# Patient Record
Sex: MALE
[De-identification: names, ages, dates, MRNs, and addresses within clinical notes are randomized per-mention and may not be internally consistent; named-entity substitution may affect disease eponyms.]

---

## 2022-07-12 ENCOUNTER — HOSPITAL ENCOUNTER (INPATIENT)
Dept: HOSPITAL 5 - ED | Age: 47
LOS: 6 days | Discharge: HOME | DRG: 641 | End: 2022-07-18
Attending: INTERNAL MEDICINE | Admitting: INTERNAL MEDICINE
Payer: SELF-PAY

## 2022-07-12 DIAGNOSIS — E87.6: Primary | ICD-10-CM
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
## 2022-07-12 DIAGNOSIS — R74.01: ICD-10-CM
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
## 2022-07-12 DIAGNOSIS — K70.10: ICD-10-CM

## 2022-07-12 DIAGNOSIS — F10.20: ICD-10-CM

## 2022-07-12 DIAGNOSIS — F10.231: ICD-10-CM

## 2022-07-12 DIAGNOSIS — S09.90XA: ICD-10-CM

## 2022-07-12 LAB
ALBUMIN SERPL-MCNC: 3.9 G/DL (ref 3.9–5)
ALT SERPL-CCNC: 171 UNITS/L (ref 7–56)
BASOPHILS # (AUTO): 0 K/MM3 (ref 0–0.1)
BASOPHILS NFR BLD AUTO: 0.3 % (ref 0–1.8)
BILIRUB UR QL STRIP: (no result)
BLOOD UR QL VISUAL: (no result)
BUN SERPL-MCNC: 6 MG/DL (ref 9–20)
BUN/CREAT SERPL: 15 %
CALCIUM SERPL-MCNC: 8.3 MG/DL (ref 8.4–10.2)
EOSINOPHIL # BLD AUTO: 0 K/MM3 (ref 0–0.4)
EOSINOPHIL NFR BLD AUTO: 0.3 % (ref 0–4.3)
HCT VFR BLD CALC: 36.5 % (ref 35.5–45.6)
HEMOLYSIS INDEX: 10
HGB BLD-MCNC: 12.9 GM/DL (ref 11.8–15.2)
HYALINE CASTS #/AREA URNS LPF: 21 /LPF
LYMPHOCYTES # BLD AUTO: 0.3 K/MM3 (ref 1.2–5.4)
LYMPHOCYTES NFR BLD AUTO: 7.1 % (ref 13.4–35)
MCHC RBC AUTO-ENTMCNC: 35 % (ref 32–34)
MCV RBC AUTO: 101 FL (ref 84–94)
MONOCYTES # (AUTO): 0.4 K/MM3 (ref 0–0.8)
MONOCYTES % (AUTO): 9 % (ref 0–7.3)
MUCOUS THREADS #/AREA URNS HPF: (no result) /HPF
PH UR STRIP: 6 [PH] (ref 5–7)
PLATELET # BLD: 38 K/MM3 (ref 140–440)
RBC # BLD AUTO: 3.6 M/MM3 (ref 3.65–5.03)
RBC #/AREA URNS HPF: 19 /HPF (ref 0–6)
UROBILINOGEN UR-MCNC: 2 MG/DL (ref ?–2)
WBC #/AREA URNS HPF: 1 /HPF (ref 0–6)

## 2022-07-12 PROCEDURE — 93005 ELECTROCARDIOGRAM TRACING: CPT

## 2022-07-12 PROCEDURE — 80053 COMPREHEN METABOLIC PANEL: CPT

## 2022-07-12 PROCEDURE — 81001 URINALYSIS AUTO W/SCOPE: CPT

## 2022-07-12 PROCEDURE — 36415 COLL VENOUS BLD VENIPUNCTURE: CPT

## 2022-07-12 PROCEDURE — 80074 ACUTE HEPATITIS PANEL: CPT

## 2022-07-12 PROCEDURE — 85025 COMPLETE CBC W/AUTO DIFF WBC: CPT

## 2022-07-12 PROCEDURE — 70450 CT HEAD/BRAIN W/O DYE: CPT

## 2022-07-12 PROCEDURE — 80048 BASIC METABOLIC PNL TOTAL CA: CPT

## 2022-07-12 PROCEDURE — 80320 DRUG SCREEN QUANTALCOHOLS: CPT

## 2022-07-12 PROCEDURE — G0480 DRUG TEST DEF 1-7 CLASSES: HCPCS

## 2022-07-12 PROCEDURE — 83690 ASSAY OF LIPASE: CPT

## 2022-07-12 PROCEDURE — 82150 ASSAY OF AMYLASE: CPT

## 2022-07-12 RX ADMIN — DEXTROSE AND SODIUM CHLORIDE SCH MLS/HR: 5; .9 INJECTION, SOLUTION INTRAVENOUS at 23:07

## 2022-07-12 RX ADMIN — Medication SCH ML: at 23:12

## 2022-07-12 RX ADMIN — LEVETIRACETAM SCH MLS/HR: 100 INJECTION, SOLUTION INTRAVENOUS at 19:28

## 2022-07-12 RX ADMIN — HEPARIN SODIUM SCH UNIT: 5000 INJECTION, SOLUTION INTRAVENOUS; SUBCUTANEOUS at 23:09

## 2022-07-12 NOTE — EVENT NOTE
Date: 07/12/22





Prior to discharge patient had a witnessed generalized tonic-clonic seizure.  

Presumably alcohol withdrawal.  Patient given Ativan and started on CIWA 

protocol.  Patient will be admitted to the hospital for further observation





Care transferred to hospitalist (Dr. Jacobo)

## 2022-07-12 NOTE — CAT SCAN REPORT
CT head/brain wo con



INDICATION:

fall.



TECHNIQUE: Routine CT head. All CT scans at this location are performed using CT dose reduction for A
DAV by means of automated exposure control.



COMPARISON: 

None.



FINDINGS:



Intracranial: Gray-white matter differentiation is maintained. No intracranial hemorrhage. No extra a
xial collection. No hydrocephalus. No herniation.

Sinuses: Moderate mucosal thickening in the paranasal sinuses. Mastoid air cells are clear..

Orbits: Globes are intact. 

Calvarium: No acute fracture.





IMPRESSION:

1.  No acute intracranial abnormality.



Signer Name: Kamar Joyner MD 

Signed: 7/12/2022 1:48 PM

Workstation Name: [id number]

## 2022-07-12 NOTE — HISTORY AND PHYSICAL REPORT
History of Present Illness


Date of examination: 07/12/22


Date of admission: 


7/12/2022


Chief complaint: 





Fall and hitting his head


Altered sensorium


History of present illness: 





The patient is a 46-year-old male present with a chief complaint of head injury 

after fall.  Patient states he recalls drinking alcohol yesterday and then 

falling injuring his head.  Patient states he does not really remember the event

 well but now has a headache in the occipital region.  Patient gives his 

headache a score of 10/10.  Patient denies neck pain.  Patient denies nausea or 

vomiting.  Patient states he drank heavily yesterday nearly half a bottle of 

tequila.  Binge drinking present.











- Past Medical History


Previous Medical History?: No





- Surgical History


Past Surgical History?: No





- Family History


Family history: no significant





- Social History


Smoking Status: Never Smoker


Substance Use Type: None, Alcohol (Drinks daily for the last month)





- Medications


Home Medications: 


                                Home Medications











 Medication  Instructions  Recorded  Confirmed  Last Taken  Type


 


traMADoL [Ultram] 50 mg PO Q6HR PRN #10 tablet 07/12/22  Unknown Rx














Review of Systems


ROS: 


Stated complaint: GROUND LEVEL FALL


Other details as noted in HPI





Constitutional: no symptoms reported


Eyes: denies: eye pain


ENT: denies: throat pain


Respiratory: no symptoms reported


Cardiovascular: denies: chest pain


Endocrine: no symptoms reported


Gastrointestinal: denies: abdominal pain, nausea, vomiting


Genitourinary: denies: dysuria


Musculoskeletal: denies: arthralgia


Neurological: headache








Medications and Allergies


                                    Allergies











Allergy/AdvReac Type Severity Reaction Status Date / Time


 


No Known Allergies Allergy   Unverified 07/12/22 11:53











                                Home Medications











 Medication  Instructions  Recorded  Confirmed  Last Taken  Type


 


traMADoL [Ultram] 50 mg PO Q6HR PRN #10 tablet 07/12/22  Unknown Rx











Active Meds: 


Active Medications





Lorazepam (Lorazepam 2 Mg/Ml Vial)  2 mg IV Q1HR PRN


   PRN Reason: CIWA-Ar 8-15


Lorazepam (Lorazepam 2 Mg/Ml Vial)  4 mg IV Q1HR PRN


   PRN Reason: CIWA-Ar 16-25


Lorazepam (Lorazepam 2 Mg/Ml Vial)  4 mg IV Q15MIN PRN


   PRN Reason: CIWA-Ar >25











Exam





- Constitutional


Vitals: 


                                        











Temp Pulse Resp BP Pulse Ox


 


 98.0 F   107 H  18   128/84   99 


 


 07/12/22 11:53  07/12/22 17:49  07/12/22 17:49  07/12/22 17:49  07/12/22 17:49











General appearance: Present: no acute distress, well-nourished





- EENT


Eyes: Present: PERRL


ENT: hearing intact, clear oral mucosa





- Neck


Neck: Present: supple, normal ROM





- Respiratory


Respiratory effort: normal


Respiratory: bilateral: CTA





- Cardiovascular


Heart rate: 78


Rhythm: regular


Heart Sounds: Present: S1 & S2.  Absent: rub, click





- Extremities


Extremities: no ischemia, pulses intact, pulses symmetrical, No edema


Peripheral Pulses: within normal limits





- Abdominal


General gastrointestinal: Present: soft, non-tender, non-distended, normal bowel

 sounds


Male genitourinary: Present: normal





- Integumentary


Integumentary: Present: clear, warm, dry





- Musculoskeletal


Musculoskeletal: gait normal, strength equal bilaterally





- Psychiatric


Psychiatric: appropriate mood/affect, intact judgment & insight





- Neurologic


Neurologic: CNII-XII intact, moves all extremities





Results





- Labs


CBC & Chem 7: 


                                 07/13/22 05:36





                                 07/13/22 05:36


Labs: 


                             Laboratory Last Values











WBC  4.4 K/mm3 (4.5-11.0)  L  07/12/22  14:08    


 


RBC  3.60 M/mm3 (3.65-5.03)  L  07/12/22  14:08    


 


Hgb  12.9 gm/dl (11.8-15.2)   07/12/22  14:08    


 


Hct  36.5 % (35.5-45.6)   07/12/22  14:08    


 


MCV  101 fl (84-94)  H  07/12/22  14:08    


 


MCH  36 pg (28-32)  H  07/12/22  14:08    


 


MCHC  35 % (32-34)  H  07/12/22  14:08    


 


RDW  15.2 % (13.2-15.2)   07/12/22  14:08    


 


Plt Count  38 K/mm3 (140-440)  L  07/12/22  14:08    


 


Lymph % (Auto)  7.1 % (13.4-35.0)  L  07/12/22  14:08    


 


Mono % (Auto)  9.0 % (0.0-7.3)  H  07/12/22  14:08    


 


Eos % (Auto)  0.3 % (0.0-4.3)   07/12/22  14:08    


 


Baso % (Auto)  0.3 % (0.0-1.8)   07/12/22  14:08    


 


Lymph # (Auto)  0.3 K/mm3 (1.2-5.4)  L  07/12/22  14:08    


 


Mono # (Auto)  0.4 K/mm3 (0.0-0.8)   07/12/22  14:08    


 


Eos # (Auto)  0.0 K/mm3 (0.0-0.4)   07/12/22  14:08    


 


Baso # (Auto)  0.0 K/mm3 (0.0-0.1)   07/12/22  14:08    


 


Seg Neutrophils %  83.3 % (40.0-70.0)  H  07/12/22  14:08    


 


Seg Neutrophils #  3.7 K/mm3 (1.8-7.7)   07/12/22  14:08    


 


Sodium  140 mmol/L (137-145)   07/12/22  14:08    


 


Potassium  3.6 mmol/L (3.6-5.0)   07/12/22  14:08    


 


Chloride  102.7 mmol/L ()   07/12/22  14:08    


 


Carbon Dioxide  22 mmol/L (22-30)   07/12/22  14:08    


 


Anion Gap  19 mmol/L  07/12/22  14:08    


 


BUN  6 mg/dL (9-20)  L  07/12/22  14:08    


 


Creatinine  0.4 mg/dL (0.8-1.3)  L  07/12/22  14:08    


 


Estimated GFR  > 60 ml/min  07/12/22  14:08    


 


BUN/Creatinine Ratio  15 %  07/12/22  14:08    


 


Glucose  100 mg/dL ()   07/12/22  14:08    


 


Calcium  8.3 mg/dL (8.4-10.2)  L  07/12/22  14:08    


 


Total Bilirubin  2.00 mg/dL (0.1-1.2)  H  07/12/22  14:08    


 


AST  379 units/L (5-40)  H  07/12/22  14:08    


 


ALT  171 units/L (7-56)  H  07/12/22  14:08    


 


Alkaline Phosphatase  125 units/L ()   07/12/22  14:08    


 


Total Protein  6.8 g/dL (6.3-8.2)   07/12/22  14:08    


 


Albumin  3.9 g/dL (3.9-5)   07/12/22  14:08    


 


Albumin/Globulin Ratio  1.3 %  07/12/22  14:08    


 


Urine Color  Marily  (Yellow)   07/12/22  Unknown


 


Urine Turbidity  Slightly-cloudy  (Clear)   07/12/22  Unknown


 


Urine pH  6.0  (5.0-7.0)   07/12/22  Unknown


 


Ur Specific Gravity  1.012  (1.003-1.030)   07/12/22  Unknown


 


Urine Protein  100 mg/dl mg/dL (Negative)   07/12/22  Unknown


 


Urine Glucose (UA)  Neg mg/dL (Negative)   07/12/22  Unknown


 


Urine Ketones  Neg mg/dL (Negative)   07/12/22  Unknown


 


Urine Blood  Mod  (Negative)   07/12/22  Unknown


 


Urine Nitrite  Neg  (Negative)   07/12/22  Unknown


 


Urine Bilirubin  Neg  (Negative)   07/12/22  Unknown


 


Urine Urobilinogen  2.0 mg/dL (<2.0)   07/12/22  Unknown


 


Ur Leukocyte Esterase  Neg  (Negative)   07/12/22  Unknown


 


Urine WBC (Auto)  1.0 /HPF (0.0-6.0)   07/12/22  Unknown


 


Urine RBC (Auto)  19.0 /HPF (0.0-6.0)   07/12/22  Unknown


 


U Epithel Cells (Auto)  1.0 /HPF (0-13.0)   07/12/22  Unknown


 


Hyaline Casts  21 /LPF  07/12/22  Unknown


 


Urine Mucus  Few /HPF  07/12/22  Unknown


 


Plasma/Serum Alcohol  0.06 % (0-0.07)   07/12/22  14:08    








                                    Short CBC











  07/12/22 07/13/22 Range/Units





  14:08 05:36 


 


WBC  4.4 L  3.2 L  (4.5-11.0)  K/mm3


 


Hgb  12.9  12.8  (11.8-15.2)  gm/dl


 


Hct  36.5  37.1  (35.5-45.6)  %


 


Plt Count  38 L  35 L  (140-440)  K/mm3








                                       BMP











  07/12/22 07/13/22





  14:08 05:36


 


Sodium  140  138


 


Potassium  3.6  3.2 L


 


Chloride  102.7  103.9


 


Carbon Dioxide  22  22


 


BUN  6 L  8 L


 


Creatinine  0.4 L  0.4 L


 


Glucose  100  104 H


 


Calcium  8.3 L  7.8 L








                                 Liver Function











  07/12/22 07/13/22 Range/Units





  14:08 05:36 


 


Total Bilirubin  2.00 H  2.60 H  (0.1-1.2)  mg/dL


 


AST  379 H  322 H  (5-40)  units/L


 


ALT  171 H  156 H  (7-56)  units/L


 


Alkaline Phosphatase  125  107  ()  units/L


 


Albumin  3.9  3.3 L  (3.9-5)  g/dL








                                      Urine











  07/12/22 Range/Units





  Unknown 


 


Urine Color  Marily  (Yellow)  


 


Urine pH  6.0  (5.0-7.0)  


 


Ur Specific Gravity  1.012  (1.003-1.030)  


 


Urine Protein  100 mg/dl  (Negative)  mg/dL


 


Urine Glucose (UA)  Neg  (Negative)  mg/dL








                                    Short CBC











  07/12/22 07/13/22 Range/Units





  14:08 05:36 


 


WBC  4.4 L  3.2 L  (4.5-11.0)  K/mm3


 


Hgb  12.9  12.8  (11.8-15.2)  gm/dl


 


Hct  36.5  37.1  (35.5-45.6)  %


 


Plt Count  38 L  35 L  (140-440)  K/mm3








                                       BMP











  07/12/22 07/13/22





  14:08 05:36


 


Sodium  140  138


 


Potassium  3.6  3.2 L


 


Chloride  102.7  103.9


 


Carbon Dioxide  22  22


 


BUN  6 L  8 L


 


Creatinine  0.4 L  0.4 L


 


Glucose  100  104 H


 


Calcium  8.3 L  7.8 L








                                 Liver Function











  07/12/22 07/13/22 Range/Units





  14:08 05:36 


 


Total Bilirubin  2.00 H  2.60 H  (0.1-1.2)  mg/dL


 


AST  379 H  322 H  (5-40)  units/L


 


ALT  171 H  156 H  (7-56)  units/L


 


Alkaline Phosphatase  125  107  ()  units/L


 


Albumin  3.9  3.3 L  (3.9-5)  g/dL








                                      Urine











  07/12/22 Range/Units





  Unknown 


 


Urine Color  Marily  (Yellow)  


 


Urine pH  6.0  (5.0-7.0)  


 


Ur Specific Gravity  1.012  (1.003-1.030)  


 


Urine Protein  100 mg/dl  (Negative)  mg/dL


 


Urine Glucose (UA)  Neg  (Negative)  mg/dL














- Imaging and Cardiology


CT Scan - head: report reviewed


Venous US: pending


Imaging and Cardiology: 





Head CT





No acute intracranial abnormality





Assessment and Plan


Advance Directives: Yes (Full code)


VTE prophylaxis?: Chemical


Plan of care discussed with patient/family: Yes





- Patient Problems


(1) Closed head injury


Current Visit: Yes   Status: Acute   


Qualifiers: 


   Encounter type: initial encounter   Qualified Code(s): S09.90XA - Unspecified

 injury of head, initial encounter   


Plan to address problem: 


No subdural hematoma


Concussion injury


Symptomatic treatment








(2) EtOH dependence


Current Visit: Yes   Status: Chronic   


Qualifiers: 


   Substance use status: uncomplicated   Qualified Code(s): F10.20 - Alcohol 

dependence, uncomplicated   


Plan to address problem: 


Patient initiated on CIWA protocol and IV fluids








(3) Transaminitis


Current Visit: Yes   Status: Acute   


Plan to address problem: 





Secondary  to alcohol


Acute hepatitis profile requeste








(4) DVT prophylaxis


Current Visit: Yes   Status: Acute   


Plan to address problem: 


On anticoagulation GI prophylaxis

## 2022-07-12 NOTE — EMERGENCY DEPARTMENT REPORT
HPI





- General


Chief Complaint: Fall


Time Seen by Provider: 22 13:21





- [facility]


HPI: 





[place] [address]








The patient is a 46-year-old male present with a chief complaint of head injury 

after fall.  Patient states he recalls drinking alcohol yesterday and then fa

lling injuring his head.  Patient states he does not really remember the event 

to well but now has a headache in the occipital region.  Patient gives his 

headache a score of 10/10.  Patient denies neck pain.  Patient denies nausea or 

vomiting





ED Past Medical Hx





- Past Medical History


Previous Medical History?: No





- Surgical History


Past Surgical History?: No





- Family History


Family history: no significant





- Social History


Smoking Status: Never Smoker


Substance Use Type: None, Alcohol (Drinks daily for the last month)





- Medications


Home Medications: 


                                Home Medications











 Medication  Instructions  Recorded  Confirmed  Last Taken  Type


 


traMADoL [Ultram] 50 mg PO Q6HR PRN #10 tablet 22  Unknown Rx














ED Review of Systems


ROS: 


Stated complaint: GROUND LEVEL FALL


Other details as noted in HPI





Constitutional: no symptoms reported


Eyes: denies: eye pain


ENT: denies: throat pain


Respiratory: no symptoms reported


Cardiovascular: denies: chest pain


Endocrine: no symptoms reported


Gastrointestinal: denies: abdominal pain, nausea, vomiting


Genitourinary: denies: dysuria


Musculoskeletal: denies: arthralgia


Neurological: headache





Physical Exam





- Physical Exam


Vital Signs: 


                                   Vital Signs











  22





  11:53


 


Temperature 98.0 F


 


Pulse Rate 83


 


Respiratory 18





Rate 


 


Blood Pressure 119/74





[Left] 


 


O2 Sat by Pulse 95





Oximetry 











Physical Exam: 





GENERAL: The patient is well-developed well-nourished male lying on stretcher 

not appearing to be in acute distress. []


HEENT: Normocephalic.  Ecchymosis to the right forehead.  Extraocular motions 

are intact.  Patient has moist mucous membranes.


NECK: Supple.  No axial tenderness to palpation.  No axial step-off


CHEST/LUNGS: Clear to auscultation.  There is no respiratory distress noted.


HEART/CARDIOVASCULAR: Regular.  There is no tachycardia.  There is no gallop rub

 or murmur.


ABDOMEN: Abdomen is soft, nontender.  Patient has normal bowel sounds.  There is

 no abdominal distention.


SKIN: There is no rash.  There is no edema.  There is no diaphoresis.


NEURO: The patient is awake, alert, and oriented.  The patient is cooperative.  

The patient has no focal neurologic deficits.  The patient has normal speech.  

GCS 15


MUSCULOSKELETAL: There is no axial tenderness to palpation.  There is no evide

nce of acute injury.





ED Course


                                   Vital Signs











  22





  11:53


 


Temperature 98.0 F


 


Pulse Rate 83


 


Respiratory 18





Rate 


 


Blood Pressure 119/74





[Left] 


 


O2 Sat by Pulse 95





Oximetry 














ED Medical Decision Making





- Lab Data


Result diagrams: 


                                 22 14:08





                                 22 14:08





                                Laboratory Tests











  22





  14:08 14:08 14:08


 


WBC  4.4 L  


 


RBC  3.60 L  


 


Hgb  12.9  


 


Hct  36.5  


 


MCV  101 H  


 


MCH  36 H  


 


MCHC  35 H  


 


RDW  15.2  


 


Plt Count  38 L  


 


Lymph % (Auto)  7.1 L  


 


Mono % (Auto)  9.0 H  


 


Eos % (Auto)  0.3  


 


Baso % (Auto)  0.3  


 


Lymph # (Auto)  0.3 L  


 


Mono # (Auto)  0.4  


 


Eos # (Auto)  0.0  


 


Baso # (Auto)  0.0  


 


Seg Neutrophils %  83.3 H  


 


Seg Neutrophils #  3.7  


 


Sodium   140 


 


Potassium   3.6 


 


Chloride   102.7 


 


Carbon Dioxide   22 


 


Anion Gap   19 


 


BUN   6 L 


 


Creatinine   0.4 L 


 


Estimated GFR   > 60 


 


BUN/Creatinine Ratio   15 


 


Glucose   100 


 


Calcium   8.3 L 


 


Total Bilirubin   2.00 H 


 


AST   379 H 


 


ALT   171 H 


 


Alkaline Phosphatase   125 


 


Total Protein   6.8 


 


Albumin   3.9 


 


Albumin/Globulin Ratio   1.3 


 


Urine Color   


 


Urine Turbidity   


 


Urine pH   


 


Ur Specific Gravity   


 


Urine Protein   


 


Urine Glucose (UA)   


 


Urine Ketones   


 


Urine Blood   


 


Urine Nitrite   


 


Urine Bilirubin   


 


Urine Urobilinogen   


 


Ur Leukocyte Esterase   


 


Urine WBC (Auto)   


 


Urine RBC (Auto)   


 


U Epithel Cells (Auto)   


 


Hyaline Casts   


 


Urine Mucus   


 


Plasma/Serum Alcohol    0.06














  22





  Unknown


 


WBC 


 


RBC 


 


Hgb 


 


Hct 


 


MCV 


 


MCH 


 


MCHC 


 


RDW 


 


Plt Count 


 


Lymph % (Auto) 


 


Mono % (Auto) 


 


Eos % (Auto) 


 


Baso % (Auto) 


 


Lymph # (Auto) 


 


Mono # (Auto) 


 


Eos # (Auto) 


 


Baso # (Auto) 


 


Seg Neutrophils % 


 


Seg Neutrophils # 


 


Sodium 


 


Potassium 


 


Chloride 


 


Carbon Dioxide 


 


Anion Gap 


 


BUN 


 


Creatinine 


 


Estimated GFR 


 


BUN/Creatinine Ratio 


 


Glucose 


 


Calcium 


 


Total Bilirubin 


 


AST 


 


ALT 


 


Alkaline Phosphatase 


 


Total Protein 


 


Albumin 


 


Albumin/Globulin Ratio 


 


Urine Color  Marily


 


Urine Turbidity  Slightly-cloudy


 


Urine pH  6.0


 


Ur Specific Gravity  1.012


 


Urine Protein  100 mg/dl


 


Urine Glucose (UA)  Neg


 


Urine Ketones  Neg


 


Urine Blood  Mod


 


Urine Nitrite  Neg


 


Urine Bilirubin  Neg


 


Urine Urobilinogen  2.0


 


Ur Leukocyte Esterase  Neg


 


Urine WBC (Auto)  1.0


 


Urine RBC (Auto)  19.0


 


U Epithel Cells (Auto)  1.0


 


Hyaline Casts  21


 


Urine Mucus  Few


 


Plasma/Serum Alcohol 














- Radiology Data


Radiology results: report reviewed (CT head), image reviewed (CT head)





[facility]  


                                     [address] [place] [address]  


 


                                          Cat Scan Report   


                                               Signed  


 


Patient: YUDITH HERRON                                                      

          MR#:   


[id number]          


: 1975                                                                

Acct:[id number]      


 


Age/Sex: 46 / M                                                                

ADM Date: 22     


 


Loc: ED       


Attending Dr:   


 


 


Ordering Physician: SILVANA MILLER MD  


Date of Service: 22  


Procedure(s): CT head/brain wo con  


Accession Number(s): [id number]  


 


cc: SILVANA MILLER MD   


 


 


CT head/brain wo con  


 


 INDICATION:  


 fall.  


 


 TECHNIQUE: Routine CT head. All CT scans at this location are performed using 

CT dose reduction for


ALARA by means of automated exposure control.  


 


 COMPARISON:   


 None.  


 


 FINDINGS:  


 


 Intracranial: Gray-white matter differentiation is maintained. No intracranial 

hemorrhage. No extra


axial collection. No hydrocephalus. No herniation.  


 Sinuses: Moderate mucosal thickening in the paranasal sinuses. Mastoid air 

cells are clear..  


 Orbits: Globes are intact.   


 Calvarium: No acute fracture.  


 


 


 IMPRESSION:  


 1.  No acute intracranial abnormality.  


 


 Signer Name: Kamar Joyner MD   


 Signed: 2022 1:48 PM  


 Workstation Name: [id number]   


 


 


Transcribed By: CS  


Dictated By: Kamar Joyner MD  


Electronically Authenticated By: Kamar Joyner MD    


Signed Date/Time: 22 1348                                


 


 


 


DD/DT: 22 1345                                                            

  


TD/TT:





- Differential Diagnosis


Closed head injury, ICH


Critical care attestation.: 


If time is entered above; I have spent that time in minutes in the direct care 

of this critically ill patient, excluding procedure time.








ED Disposition


Clinical Impression: 


 Closed head injury, Alcoholic hepatitis





Disposition:  HOME / SELF CARE / HOMELESS


Is pt being admited?: No


Does the pt Need Aspirin: No


Condition: Stable


Instructions:  Head Injury, Adult, Easy-to-Read


Additional Instructions: 


Return to the emergency department should you develop worsening symptoms, 

inability to tolerate food or liquids, high fever or any other concerns


Prescriptions: 


traMADoL [Ultram] 50 mg PO Q6HR PRN #10 tablet


 PRN Reason: Pain


Referrals: 


PRIMARY CARE,MD [Primary Care Provider] - 3-5 Days


[facility] [Provider Group] - 3-5 Days


Time of Disposition: 16:36

## 2022-07-13 LAB
ALBUMIN SERPL-MCNC: 3.3 G/DL (ref 3.9–5)
ALT SERPL-CCNC: 156 UNITS/L (ref 7–56)
BASOPHILS # (AUTO): 0 K/MM3 (ref 0–0.1)
BASOPHILS NFR BLD AUTO: 0.7 % (ref 0–1.8)
BUN SERPL-MCNC: 8 MG/DL (ref 9–20)
BUN/CREAT SERPL: 20 %
CALCIUM SERPL-MCNC: 7.8 MG/DL (ref 8.4–10.2)
EOSINOPHIL # BLD AUTO: 0.1 K/MM3 (ref 0–0.4)
EOSINOPHIL NFR BLD AUTO: 2 % (ref 0–4.3)
HCT VFR BLD CALC: 37.1 % (ref 35.5–45.6)
HEMOLYSIS INDEX: 5
HGB BLD-MCNC: 12.8 GM/DL (ref 11.8–15.2)
LYMPHOCYTES # BLD AUTO: 0.6 K/MM3 (ref 1.2–5.4)
LYMPHOCYTES NFR BLD AUTO: 17.5 % (ref 13.4–35)
MCHC RBC AUTO-ENTMCNC: 34 % (ref 32–34)
MCV RBC AUTO: 103 FL (ref 84–94)
MONOCYTES # (AUTO): 0.4 K/MM3 (ref 0–0.8)
MONOCYTES % (AUTO): 12 % (ref 0–7.3)
PLATELET # BLD: 35 K/MM3 (ref 140–440)
RBC # BLD AUTO: 3.6 M/MM3 (ref 3.65–5.03)

## 2022-07-13 RX ADMIN — HEPARIN SODIUM SCH UNIT: 5000 INJECTION, SOLUTION INTRAVENOUS; SUBCUTANEOUS at 08:54

## 2022-07-13 RX ADMIN — HEPARIN SODIUM SCH: 5000 INJECTION, SOLUTION INTRAVENOUS; SUBCUTANEOUS at 18:43

## 2022-07-13 RX ADMIN — FAMOTIDINE SCH MG: 20 TABLET ORAL at 08:53

## 2022-07-13 RX ADMIN — Medication SCH ML: at 21:23

## 2022-07-13 RX ADMIN — LEVETIRACETAM SCH MLS/HR: 100 INJECTION, SOLUTION INTRAVENOUS at 21:33

## 2022-07-13 RX ADMIN — LEVETIRACETAM SCH: 100 INJECTION, SOLUTION INTRAVENOUS at 00:30

## 2022-07-13 RX ADMIN — Medication SCH ML: at 08:54

## 2022-07-13 RX ADMIN — HEPARIN SODIUM SCH UNIT: 5000 INJECTION, SOLUTION INTRAVENOUS; SUBCUTANEOUS at 21:22

## 2022-07-13 RX ADMIN — LEVETIRACETAM SCH MLS/HR: 100 INJECTION, SOLUTION INTRAVENOUS at 08:00

## 2022-07-13 RX ADMIN — FAMOTIDINE SCH: 20 TABLET ORAL at 18:43

## 2022-07-13 RX ADMIN — Medication SCH: at 18:43

## 2022-07-13 RX ADMIN — FAMOTIDINE SCH MG: 20 TABLET ORAL at 21:22

## 2022-07-14 RX ADMIN — FAMOTIDINE SCH MG: 20 TABLET ORAL at 08:40

## 2022-07-14 RX ADMIN — HEPARIN SODIUM SCH UNIT: 5000 INJECTION, SOLUTION INTRAVENOUS; SUBCUTANEOUS at 08:50

## 2022-07-14 RX ADMIN — DEXTROSE AND SODIUM CHLORIDE SCH MLS/HR: 5; .9 INJECTION, SOLUTION INTRAVENOUS at 05:46

## 2022-07-14 RX ADMIN — HEPARIN SODIUM SCH UNIT: 5000 INJECTION, SOLUTION INTRAVENOUS; SUBCUTANEOUS at 21:44

## 2022-07-14 RX ADMIN — ZIPRASIDONE MESYLATE PRN MG: 20 INJECTION, POWDER, LYOPHILIZED, FOR SOLUTION INTRAMUSCULAR at 15:48

## 2022-07-14 RX ADMIN — Medication SCH ML: at 21:45

## 2022-07-14 RX ADMIN — FAMOTIDINE SCH: 20 TABLET ORAL at 10:34

## 2022-07-14 RX ADMIN — FAMOTIDINE SCH MG: 20 TABLET ORAL at 21:45

## 2022-07-14 RX ADMIN — Medication SCH ML: at 10:34

## 2022-07-14 RX ADMIN — HEPARIN SODIUM SCH: 5000 INJECTION, SOLUTION INTRAVENOUS; SUBCUTANEOUS at 10:33

## 2022-07-14 NOTE — PROGRESS NOTE
Assessment and Plan





- Patient Problems


(1) Closed head injury


Current Visit: Yes   Status: Acute   


Qualifiers: 


   Encounter type: initial encounter   Qualified Code(s): S09.90XA - Unspecified

injury of head, initial encounter   


Plan to address problem: 


No subdural hematoma


Concussion injury


Symptomatic treatment








(2) EtOH dependence


Current Visit: Yes   Status: Chronic   


Qualifiers: 


   Substance use status: uncomplicated   Qualified Code(s): F10.20 - Alcohol 

dependence, uncomplicated   


Plan to address problem: 


Patient initiated on CIWA protocol and IV fluids








(3) Transaminitis


Current Visit: Yes   Status: Acute   


Plan to address problem: 





Secondary  to alcohol











(4) Delirium tremens


Current Visit: Yes   Status: Acute   


Plan to address problem: 


Worsening


Geodon 20 mg every 12


Four-point restraint








(5) DVT prophylaxis


Current Visit: Yes   Status: Acute   


Plan to address problem: 


On anticoagulation GI prophylaxis








(6) Advance care planning


Current Visit: Yes   Status: Acute   


Plan to address problem: 


Disease education conducted, care plan discussed, diagnosis discussed, prognosis

discussed.








Subjective


Date of service: 07/14/22


Principal diagnosis: EtOH dependence and delirium tremens


Interval history: 





The patient is a 46-year-old male present with a chief complaint of head injury 

after fall.  Patient states he recalls drinking alcohol yesterday and then 

falling injuring his head.  Patient states he does not really remember the event

 well but now has a headache in the occipital region.  Patient gives his 

headache a score of 10/10.  Patient denies neck pain.  Patient denies nausea or 

vomiting.  Patient states he drank heavily yesterday nearly half a bottle of 

tequila.  Binge drinking present.








7/13/2022


EtOH dependence


Patient and DTs


CIWA protocol initiated





7/14/2022


Patient more agitated


Had to report four-point restraints


Geodon initiated











Objective





- Constitutional


Vitals: 


                               Vital Signs - 12hr











  07/14/22





  05:52


 


Temperature 98.4 F


 


Pulse Rate 76


 


Respiratory 18





Rate 


 


Blood Pressure 117/77





[Left] 


 


O2 Sat by Pulse 100





Oximetry 











General appearance: Present: no acute distress, well-nourished





- EENT


Eyes: PERRL, EOM intact


ENT: hearing intact, clear oral mucosa


Ears: bilateral: normal





- Neck


Neck: supple, normal ROM





- Respiratory


Respiratory effort: normal


Respiratory: bilateral: CTA





- Breasts


Breasts: normal





- Cardiovascular


Heart rate: 78


Rhythm: regular


Heart Sounds: Present: S1 & S2.  Absent: gallop, rub


Extremities: pulses intact, No edema, normal color, Full ROM





- Gastrointestinal


General gastrointestinal: Present: soft, non-tender, non-distended, normal bowel

 sounds





- Genitourinary


Male genitourinary: normal





- Integumentary


Integumentary: clear, warm, dry





- Musculoskeletal


Musculoskeletal: 1, strength equal bilaterally





- Neurologic


Neurologic: moves all extremities





- Psychiatric


Psychiatric: memory intact, appropriate mood/affect, intact judgment & insight





- Labs


CBC & Chem 7: 


                                 07/13/22 05:36





                                 07/13/22 05:36

## 2022-07-14 NOTE — PROGRESS NOTE
Assessment and Plan





- Patient Problems


(1) Closed head injury


Current Visit: Yes   Status: Acute   


Qualifiers: 


   Encounter type: initial encounter   Qualified Code(s): S09.90XA - Unspecified

injury of head, initial encounter   


Plan to address problem: 


No subdural hematoma


Concussion injury


Symptomatic treatment








(2) EtOH dependence


Current Visit: Yes   Status: Chronic   


Qualifiers: 


   Substance use status: uncomplicated   Qualified Code(s): F10.20 - Alcohol 

dependence, uncomplicated   


Plan to address problem: 


Patient initiated on CIWA protocol and IV fluids








(3) Transaminitis


Current Visit: Yes   Status: Acute   


Plan to address problem: 





Secondary  to alcohol


Acute hepatitis profile requeste








(4) Delirium tremens


Current Visit: Yes   Status: Acute   


Plan to address problem: 


On CIWA protocol with Ativan and Geodon








(5) DVT prophylaxis


Current Visit: Yes   Status: Acute   


Plan to address problem: 


On anticoagulation GI prophylaxis








(6) Advance care planning


Current Visit: Yes   Status: Acute   


Plan to address problem: 


Disease education conducted, care plan discussed, diagnosis discussed and 

patient acknowledges understanding with care plan.  +30 minutes.








Subjective


Date of service: 07/13/22


Principal diagnosis: EtOH dependence and delirium tremens


Interval history: 





The patient is a 46-year-old male present with a chief complaint of head injury 

after fall.  Patient states he recalls drinking alcohol yesterday and then 

falling injuring his head.  Patient states he does not really remember the event

 well but now has a headache in the occipital region.  Patient gives his 

headache a score of 10/10.  Patient denies neck pain.  Patient denies nausea or 

vomiting.  Patient states he drank heavily yesterday nearly half a bottle of 

tequila.  Binge drinking present.








7/13/2022


EtOH dependence


Patient and DTs


CIWA protocol initiated











Objective





- Constitutional


Vitals: 


                               Vital Signs - 12hr











  07/14/22





  05:52


 


Temperature 98.4 F


 


Pulse Rate 76


 


Respiratory 18





Rate 


 


Blood Pressure 117/77





[Left] 


 


O2 Sat by Pulse 100





Oximetry 











General appearance: Present: no acute distress, well-nourished





- EENT


Eyes: PERRL, EOM intact


ENT: hearing intact, clear oral mucosa


Ears: bilateral: normal





- Neck


Neck: supple, normal ROM





- Respiratory


Respiratory effort: normal


Respiratory: bilateral: CTA





- Breasts


Breasts: normal





- Cardiovascular


Heart rate: 78


Rhythm: regular


Heart Sounds: Present: S1 & S2.  Absent: gallop, rub


Extremities: pulses intact, No edema, normal color, Full ROM





- Gastrointestinal


General gastrointestinal: Present: soft, non-tender, non-distended, normal bowel

 sounds





- Genitourinary


Male genitourinary: normal





- Integumentary


Integumentary: clear, warm, dry





- Musculoskeletal


Musculoskeletal: 1, strength equal bilaterally





- Neurologic


Neurologic: moves all extremities





- Psychiatric


Psychiatric: memory intact, appropriate mood/affect, intact judgment & insight





- Allied health notes


Allied health notes reviewed: nursing, case management





- Labs


CBC & Chem 7: 


                                 07/13/22 05:36





                                 07/13/22 05:36

## 2022-07-14 NOTE — ELECTROCARDIOGRAPH REPORT
[facility]

                                       

Test Date:    2022               Test Time:    17:46:12

Pat Name:     YUDITH HERRON        Department:   

Patient ID:   [id number]          Room:         A366 1

Gender:       M                        Technician:   TECH

:          1975               Requested By: UMER ALVARADO

Order Number: [id number]              Reading MD:   Master Ruth

                                 Measurements

Intervals                              Axis          

Rate:         100                      P:            61

IA:           152                      QRS:          84

QRSD:         116                      T:            1

QT:           352                                    

QTc:          453                                    

                           Interpretive Statements

Sinus tachycardia

Probable left atrial enlargement

nonspecific st-t

No previous ECG available for comparison

Electronically Signed On 2022 9:12:44 EDT by Master Ruth

## 2022-07-15 RX ADMIN — Medication SCH ML: at 22:29

## 2022-07-15 RX ADMIN — FAMOTIDINE SCH MG: 20 TABLET ORAL at 22:29

## 2022-07-15 RX ADMIN — Medication SCH ML: at 12:41

## 2022-07-15 RX ADMIN — HEPARIN SODIUM SCH UNIT: 5000 INJECTION, SOLUTION INTRAVENOUS; SUBCUTANEOUS at 12:40

## 2022-07-15 RX ADMIN — DEXTROSE AND SODIUM CHLORIDE SCH MLS/HR: 5; .9 INJECTION, SOLUTION INTRAVENOUS at 22:31

## 2022-07-15 RX ADMIN — ZIPRASIDONE MESYLATE PRN MG: 20 INJECTION, POWDER, LYOPHILIZED, FOR SOLUTION INTRAMUSCULAR at 05:37

## 2022-07-15 RX ADMIN — FAMOTIDINE SCH MG: 20 TABLET ORAL at 12:40

## 2022-07-15 RX ADMIN — HEPARIN SODIUM SCH UNIT: 5000 INJECTION, SOLUTION INTRAVENOUS; SUBCUTANEOUS at 22:28

## 2022-07-16 RX ADMIN — FAMOTIDINE SCH MG: 20 TABLET ORAL at 09:36

## 2022-07-16 RX ADMIN — HEPARIN SODIUM SCH UNIT: 5000 INJECTION, SOLUTION INTRAVENOUS; SUBCUTANEOUS at 09:38

## 2022-07-16 RX ADMIN — Medication SCH ML: at 09:36

## 2022-07-16 RX ADMIN — FAMOTIDINE SCH MG: 20 TABLET ORAL at 21:47

## 2022-07-16 RX ADMIN — Medication SCH ML: at 21:47

## 2022-07-16 RX ADMIN — DEXTROSE AND SODIUM CHLORIDE SCH MLS/HR: 5; .9 INJECTION, SOLUTION INTRAVENOUS at 06:31

## 2022-07-16 RX ADMIN — DEXTROSE AND SODIUM CHLORIDE SCH MLS/HR: 5; .9 INJECTION, SOLUTION INTRAVENOUS at 17:17

## 2022-07-16 RX ADMIN — HEPARIN SODIUM SCH UNIT: 5000 INJECTION, SOLUTION INTRAVENOUS; SUBCUTANEOUS at 21:47

## 2022-07-16 NOTE — PROGRESS NOTE
Assessment and Plan





- Patient Problems


(1) Closed head injury


Current Visit: Yes   Status: Acute   


Qualifiers: 


   Encounter type: initial encounter   Qualified Code(s): S09.90XA - Unspecified

injury of head, initial encounter   


Plan to address problem: 


No subdural hematoma


Concussion injury


Symptomatic treatment








(2) EtOH dependence


Current Visit: Yes   Status: Chronic   


Qualifiers: 


   Substance use status: uncomplicated   Qualified Code(s): F10.20 - Alcohol 

dependence, uncomplicated   


Plan to address problem: 


Patient initiated on CIWA protocol and IV fluids








(3) Transaminitis


Current Visit: Yes   Status: Acute   


Plan to address problem: 





Secondary  to alcohol











(4) Delirium tremens


Current Visit: Yes   Status: Acute   


Plan to address problem: 


Worsening


Geodon 20 mg every 12


Four-point restraint








(5) DVT prophylaxis


Current Visit: Yes   Status: Acute   


Plan to address problem: 


On anticoagulation GI prophylaxis








(6) Advance care planning


Current Visit: Yes   Status: Acute   


Plan to address problem: 


Disease education conducted, care plan discussed, diagnosis discussed, prognosis

discussed.








Subjective


Date of service: 07/15/22


Principal diagnosis: EtOH dependence and delirium tremens


Interval history: 





The patient is a 46-year-old male present with a chief complaint of head injury 

after fall.  Patient states he recalls drinking alcohol yesterday and then 

falling injuring his head.  Patient states he does not really remember the event

 well but now has a headache in the occipital region.  Patient gives his 

headache a score of 10/10.  Patient denies neck pain.  Patient denies nausea or 

vomiting.  Patient states he drank heavily yesterday nearly half a bottle of 

tequila.  Binge drinking present.








7/13/2022


EtOH dependence


Patient and DTs


CIWA protocol initiated





7/14/2022


Patient more agitated


Had to report four-point restraints


Geodon initiated





7/15/2022


Patient still in four-point restraints


More calmer than yesterday


Less agitated











Objective





- Constitutional


Vitals: 


                               Vital Signs - 12hr











  07/16/22 07/16/22





  06:10 11:16


 


Temperature 99.8 F H 


 


Pulse Rate 94 H 90


 


Respiratory 18 16





Rate  


 


Blood Pressure 138/87 119/82


 


O2 Sat by Pulse 97 97





Oximetry  











General appearance: Present: no acute distress, well-nourished





- EENT


Eyes: PERRL, EOM intact


ENT: hearing intact, clear oral mucosa


Ears: bilateral: normal





- Neck


Neck: supple, normal ROM





- Respiratory


Respiratory effort: normal


Respiratory: bilateral: CTA





- Breasts


Breasts: normal





- Cardiovascular


Rhythm: regular


Heart Sounds: Present: S1 & S2.  Absent: gallop, rub


Extremities: pulses intact, No edema, normal color, Full ROM





- Gastrointestinal


General gastrointestinal: Present: soft, non-tender, non-distended, normal bowel

sounds





- Genitourinary


Male genitourinary: normal





- Integumentary


Integumentary: clear, warm, dry





- Musculoskeletal


Musculoskeletal: 1, strength equal bilaterally





- Neurologic


Neurologic: moves all extremities





- Psychiatric


Psychiatric: agitated





- Allied health notes


Allied health notes reviewed: nursing, case management





- Labs


CBC & Chem 7: 


                                 07/13/22 05:36





                                 07/13/22 05:36

## 2022-07-16 NOTE — PROGRESS NOTE
Assessment and Plan





- Patient Problems


(1) Closed head injury


Current Visit: Yes   Status: Acute   


Qualifiers: 


   Encounter type: initial encounter   Qualified Code(s): S09.90XA - Unspecified

injury of head, initial encounter   


Plan to address problem: 


No subdural hematoma


Concussion injury


Symptomatic treatment








(2) EtOH dependence


Current Visit: Yes   Status: Chronic   


Qualifiers: 


   Substance use status: uncomplicated   Qualified Code(s): F10.20 - Alcohol 

dependence, uncomplicated   


Plan to address problem: 


Patient initiated on CIWA protocol and IV fluids








(3) Transaminitis


Current Visit: Yes   Status: Acute   


Plan to address problem: 





Secondary  to alcohol











(4) Delirium tremens


Current Visit: Yes   Status: Acute   


Plan to address problem: 


Worsening


Geodon 20 mg every 12


Four-point restraint








(5) DVT prophylaxis


Current Visit: Yes   Status: Acute   


Plan to address problem: 


On anticoagulation GI prophylaxis








(6) Advance care planning


Current Visit: Yes   Status: Acute   


Plan to address problem: 


Disease education conducted, care plan discussed, diagnosis discussed, prognosis

discussed.








Subjective


Date of service: 07/16/22


Principal diagnosis: EtOH dependence and delirium tremens


Interval history: 





The patient is a 46-year-old male present with a chief complaint of head injury 

after fall.  Patient states he recalls drinking alcohol yesterday and then 

falling injuring his head.  Patient states he does not really remember the event

 well but now has a headache in the occipital region.  Patient gives his 

headache a score of 10/10.  Patient denies neck pain.  Patient denies nausea or 

vomiting.  Patient states he drank heavily yesterday nearly half a bottle of 

tequila.  Binge drinking present.








7/13/2022


EtOH dependence


Patient and DTs


CIWA protocol initiated





7/14/2022


Patient more agitated


Had to report four-point restraints


Geodon initiated





7/15/2022


Patient still in four-point restraints


More calmer than yesterday


Less agitated








7/16/2022


Patient still agitated but less agitated











Objective





- Constitutional


Vitals: 


                               Vital Signs - 12hr











  07/16/22 07/16/22





  06:10 11:16


 


Temperature 99.8 F H 


 


Pulse Rate 94 H 90


 


Respiratory 18 16





Rate  


 


Blood Pressure 138/87 119/82


 


O2 Sat by Pulse 97 97





Oximetry  











General appearance: Present: no acute distress, well-nourished





- EENT


Eyes: PERRL, EOM intact


ENT: hearing intact, clear oral mucosa


Ears: bilateral: normal





- Neck


Neck: supple, normal ROM





- Respiratory


Respiratory effort: normal


Respiratory: bilateral: CTA





- Breasts


Breasts: normal





- Cardiovascular


Heart rate: 78


Rhythm: regular


Heart Sounds: Present: S1 & S2.  Absent: gallop, rub


Extremities: pulses intact, No edema, normal color, Full ROM





- Gastrointestinal


General gastrointestinal: Present: soft, non-tender, non-distended, normal bowel

sounds





- Genitourinary


Male genitourinary: normal





- Integumentary


Integumentary: clear, warm, dry





- Musculoskeletal


Musculoskeletal: 1, strength equal bilaterally





- Neurologic


Neurologic: moves all extremities





- Psychiatric


Psychiatric: memory intact, agitated





- Allied health notes


Allied health notes reviewed: nursing, case management





- Labs


CBC & Chem 7: 


                                 07/13/22 05:36





                                 07/13/22 05:36

## 2022-07-17 LAB
ALBUMIN SERPL-MCNC: 3.2 G/DL (ref 3.9–5)
ALT SERPL-CCNC: 97 UNITS/L (ref 7–56)
BASOPHILS # (AUTO): 0 K/MM3 (ref 0–0.1)
BASOPHILS NFR BLD AUTO: 0.9 % (ref 0–1.8)
BUN SERPL-MCNC: 4 MG/DL (ref 9–20)
BUN/CREAT SERPL: 10 %
CALCIUM SERPL-MCNC: 8.4 MG/DL (ref 8.4–10.2)
EOSINOPHIL # BLD AUTO: 0.2 K/MM3 (ref 0–0.4)
EOSINOPHIL NFR BLD AUTO: 3.6 % (ref 0–4.3)
HCT VFR BLD CALC: 38.8 % (ref 35.5–45.6)
HEMOLYSIS INDEX: 3
HGB BLD-MCNC: 13 GM/DL (ref 11.8–15.2)
LYMPHOCYTES # BLD AUTO: 1.1 K/MM3 (ref 1.2–5.4)
LYMPHOCYTES NFR BLD AUTO: 23.5 % (ref 13.4–35)
MCHC RBC AUTO-ENTMCNC: 34 % (ref 32–34)
MCV RBC AUTO: 104 FL (ref 84–94)
MONOCYTES # (AUTO): 0.7 K/MM3 (ref 0–0.8)
MONOCYTES % (AUTO): 15.9 % (ref 0–7.3)
PLATELET # BLD: 84 K/MM3 (ref 140–440)
RBC # BLD AUTO: 3.72 M/MM3 (ref 3.65–5.03)

## 2022-07-17 RX ADMIN — POTASSIUM CHLORIDE SCH MLS/HR: 10 INJECTION, SOLUTION INTRAVENOUS at 21:36

## 2022-07-17 RX ADMIN — POTASSIUM CHLORIDE SCH MLS/HR: 10 INJECTION, SOLUTION INTRAVENOUS at 23:54

## 2022-07-17 RX ADMIN — HEPARIN SODIUM SCH UNIT: 5000 INJECTION, SOLUTION INTRAVENOUS; SUBCUTANEOUS at 21:38

## 2022-07-17 RX ADMIN — POTASSIUM CHLORIDE SCH MLS/HR: 10 INJECTION, SOLUTION INTRAVENOUS at 22:45

## 2022-07-17 RX ADMIN — FAMOTIDINE SCH MG: 20 TABLET ORAL at 21:38

## 2022-07-17 RX ADMIN — Medication SCH ML: at 21:39

## 2022-07-17 RX ADMIN — Medication SCH ML: at 11:37

## 2022-07-17 RX ADMIN — DEXTROSE AND SODIUM CHLORIDE SCH MLS/HR: 5; .9 INJECTION, SOLUTION INTRAVENOUS at 04:02

## 2022-07-17 RX ADMIN — FAMOTIDINE SCH MG: 20 TABLET ORAL at 10:37

## 2022-07-17 RX ADMIN — POTASSIUM CHLORIDE SCH MLS/HR: 10 INJECTION, SOLUTION INTRAVENOUS at 19:38

## 2022-07-17 RX ADMIN — HEPARIN SODIUM SCH UNIT: 5000 INJECTION, SOLUTION INTRAVENOUS; SUBCUTANEOUS at 10:36

## 2022-07-17 NOTE — PROGRESS NOTE
Assessment and Plan





- Patient Problems


(1) Closed head injury


Current Visit: Yes   Status: Acute   


Qualifiers: 


   Encounter type: initial encounter   Qualified Code(s): S09.90XA - Unspecified

injury of head, initial encounter   


Plan to address problem: 


No subdural hematoma


Concussion injury


Symptomatic treatment








(2) EtOH dependence


Current Visit: Yes   Status: Chronic   


Qualifiers: 


   Substance use status: uncomplicated   Qualified Code(s): F10.20 - Alcohol 

dependence, uncomplicated   


Plan to address problem: 


Patient initiated on CIWA protocol and IV fluids








(3) Transaminitis


Current Visit: Yes   Status: Acute   


Plan to address problem: 





Secondary  to alcohol


LFTs improving


Initially AST was 379 improved to 322 to 102





ALT improved from 171 to 156 to 97











(4) Delirium tremens


Current Visit: Yes   Status: Acute   


Plan to address problem: 


Worsening


Geodon 20 mg every 12


Four-point restraint








(5) DVT prophylaxis


Current Visit: Yes   Status: Acute   


Plan to address problem: 


On anticoagulation GI prophylaxis








(6) Advance care planning


Current Visit: Yes   Status: Acute   


Plan to address problem: 


Disease education conducted, care plan discussed, diagnosis discussed, prognosis

discussed.








Subjective


Date of service: 07/17/22


Principal diagnosis: EtOH dependence and delirium tremens


Interval history: 





The patient is a 46-year-old male present with a chief complaint of head injury 

after fall.  Patient states he recalls drinking alcohol yesterday and then 

falling injuring his head.  Patient states he does not really remember the event

 well but now has a headache in the occipital region.  Patient gives his 

headache a score of 10/10.  Patient denies neck pain.  Patient denies nausea or 

vomiting.  Patient states he drank heavily yesterday nearly half a bottle of 

tequila.  Binge drinking present.








7/13/2022


EtOH dependence


Patient and DTs


CIWA protocol initiated





7/14/2022


Patient more agitated


Had to report four-point restraints


Geodon initiated





7/15/2022


Patient still in four-point restraints


More calmer than yesterday


Less agitated








7/16/2022


Patient still agitated but less agitated





7/17/2022


Potassium is  2.9


Potassium supplemented


























Objective





- Constitutional


Vitals: 


                               Vital Signs - 12hr











  07/17/22





  04:24


 


Temperature 98.4 F


 


Pulse Rate 102 H


 


Respiratory 18





Rate 


 


Blood Pressure 131/88


 


O2 Sat by Pulse 99





Oximetry 











General appearance: Present: no acute distress, well-nourished





- EENT


Eyes: PERRL, EOM intact


ENT: hearing intact, clear oral mucosa


Ears: bilateral: normal





- Neck


Neck: supple, normal ROM





- Respiratory


Respiratory effort: normal


Respiratory: bilateral: CTA





- Breasts


Breasts: normal





- Cardiovascular


Heart rate: 78


Rhythm: regular


Heart Sounds: Present: S1 & S2.  Absent: gallop, rub


Extremities: pulses intact, No edema, normal color, Full ROM





- Gastrointestinal


General gastrointestinal: Present: soft, non-tender, non-distended, normal bowel

sounds





- Genitourinary


Male genitourinary: normal





- Integumentary


Integumentary: clear, warm, dry





- Musculoskeletal


Musculoskeletal: 1, strength equal bilaterally





- Neurologic


Neurologic: moves all extremities





- Psychiatric


Psychiatric: memory intact, appropriate mood/affect, intact judgment & insight





- Labs


CBC & Chem 7: 


                                 07/17/22 05:05





                                 07/17/22 05:05


Labs: 


                              Abnormal lab results











  07/17/22 07/17/22 Range/Units





  05:05 05:05 


 


MCV  104 H   (84-94)  fl


 


MCH  35 H   (28-32)  pg


 


Plt Count  84 L   (140-440)  K/mm3


 


Mono % (Auto)  15.9 H   (0.0-7.3)  %


 


Lymph # (Auto)  1.1 L   (1.2-5.4)  K/mm3


 


Potassium   2.9 L*  (3.6-5.0)  mmol/L


 


BUN   4 L  (9-20)  mg/dL


 


Creatinine   0.4 L  (0.8-1.3)  mg/dL


 


Total Bilirubin   1.80 H  (0.1-1.2)  mg/dL


 


AST   102 H  (5-40)  units/L


 


ALT   97 H  (7-56)  units/L


 


Albumin   3.2 L  (3.9-5)  g/dL

## 2022-07-18 VITALS — DIASTOLIC BLOOD PRESSURE: 71 MMHG | SYSTOLIC BLOOD PRESSURE: 108 MMHG

## 2022-07-18 LAB
BUN SERPL-MCNC: 6 MG/DL (ref 9–20)
BUN/CREAT SERPL: 12 %
CALCIUM SERPL-MCNC: 8.2 MG/DL (ref 8.4–10.2)
HEMOLYSIS INDEX: 2

## 2022-07-18 RX ADMIN — FAMOTIDINE SCH: 20 TABLET ORAL at 10:59

## 2022-07-18 RX ADMIN — HEPARIN SODIUM SCH: 5000 INJECTION, SOLUTION INTRAVENOUS; SUBCUTANEOUS at 10:58

## 2022-07-18 NOTE — DISCHARGE SUMMARY
Providers





- Providers


Date of Admission: 


07/12/22 18:43





Date of discharge: 07/18/22


Attending physician: 


LAWANDA ALARCON





Primary care physician: 


[facility] MD








Hospitalization


Condition: Stable


Hospital course: 


Subjective


Date of service: 07/18/22


Principal diagnosis: EtOH dependence and delirium tremens


Interval history: 





The patient is a 46-year-old male present with a chief complaint of head injury 

after fall.  Patient states he recalls drinking alcohol yesterday and then 

falling injuring his head.  Patient states he does not really remember the event

 well but now has a headache in the occipital region.  Patient gives his 

headache a score of 10/10.  Patient denies neck pain.  Patient denies nausea or 

vomiting.  Patient states he drank heavily yesterday nearly half a bottle of 

tequila.  Binge drinking present.








7/13/2022


EtOH dependence


Patient and DTs


CIWA protocol initiated





7/14/2022


Patient more agitated


Had to report four-point restraints


Geodon initiated





7/15/2022


Patient still in four-point restraints


More calmer than yesterday


Less agitated








7/16/2022


Patient still agitated but less agitated





7/17/2022


Potassium is  2.9


Potassium supplemented





7/18/2022


Potassium is normal 3.7


No DTs


Patient to be discharged home


Thiamine and folic acid and Ativan











Assessment and Plan





- Patient Problems


(1) Hypokalemia


Current Visit: Yes   Status: Acute   Plan to address problem: 


Corrected





(2) EtOH dependence


Current Visit: Yes   Status: Chronic   


Qualifiers: 


   Substance use status: uncomplicated   Qualified Code(s): F10.20 - Alcohol 

dependence, uncomplicated   


Plan to address problem: 


No DTs





(3) Transaminitis


Current Visit: Yes   Status: Acute   


Plan to address problem: 





Secondary  to alcohol


LFTs improving


Initially AST was 379 improved to 322 to 102





ALT improved from 171 to 156 to 97











(4) Delirium tremens


Current Visit: Yes   Status: Acute   


Plan to address problem: 


DTs are improved





Patient to be discharged on Ativan with follow-up with primary care and alcohol 

rehab place as outpatient


 No restraints





(5) DVT prophylaxis


Current Visit: Yes   Status: Acute   


Plan to address problem: 


On anticoagulation GI prophylaxis








(6) Advance care planning


Current Visit: Yes   Status: Acute   


Plan to address problem: 


Disease education conducted, care plan discussed, diagnosis discussed, prognosis

discussed.











Disposition: 01 HOME / SELF CARE / HOMELESS


Final Discharge Diagnosis (Prints w/discharge instructions): Closed head injury.

 Delirium tremens.  EtOH dependence.  Hypokalemia.  Transaminitis


Time spent for discharge: 35 minutes





- Discharge Diagnoses


(1) Closed head injury


Status: Acute   


Qualifiers: 


   Encounter type: initial encounter   Qualified Code(s): S09.90XA - Unspecified

injury of head, initial encounter   





(2) EtOH dependence


Status: Chronic   


Qualifiers: 


   Substance use status: uncomplicated   Qualified Code(s): F10.20 - Alcohol 

dependence, uncomplicated   





(3) Transaminitis


Status: Acute   





(4) Delirium tremens


Status: Acute   





(5) DVT prophylaxis


Status: Acute   





(6) Advance care planning


Status: Acute   





Core Measure Documentation





- Palliative Care


Palliative Care/ Comfort Measures: Not Applicable





- Core Measures


Any of the following diagnoses?: none





Exam





- Constitutional


Vitals: 


                                        











Temp Pulse Resp BP Pulse Ox


 


 98.0 F   83   18   108/71   98 


 


 07/18/22 03:55  07/18/22 03:55  07/18/22 03:55  07/18/22 03:55  07/18/22 03:55











General appearance: Present: no acute distress, well-nourished





- EENT


Eyes: Present: PERRL


ENT: hearing intact, clear oral mucosa





- Neck


Neck: Present: supple, normal ROM





- Respiratory


Respiratory effort: normal


Respiratory: bilateral: CTA





- Cardiovascular


Heart rate: 78


Rhythm: regular


Heart Sounds: Present: S1 & S2.  Absent: rub, click





- Extremities


Extremities: no ischemia, pulses intact, pulses symmetrical, No edema


Peripheral Pulses: within normal limits





- Abdominal


General gastrointestinal: Present: soft, non-tender, non-distended, normal bowel

sounds


Male genitourinary: Present: normal





- Integumentary


Integumentary: Present: clear, warm, dry





- Musculoskeletal


Musculoskeletal: gait normal, strength equal bilaterally





- Psychiatric


Psychiatric: appropriate mood/affect, intact judgment & insight





- Neurologic


Neurologic: CNII-XII intact, moves all extremities





- Allied Health


Allied health notes reviewed: nursing, case management





Plan


Activity: no restrictions


Diet: regular


Follow up with: 


[facility] [Provider Group] - 3-5 Days


[facility],MD [Primary Care Provider] - 3-5 Days


Prescriptions: 


traMADoL [Ultram] 50 mg PO Q6HR PRN #10 tablet


 PRN Reason: Pain